# Patient Record
Sex: FEMALE | URBAN - METROPOLITAN AREA
[De-identification: names, ages, dates, MRNs, and addresses within clinical notes are randomized per-mention and may not be internally consistent; named-entity substitution may affect disease eponyms.]

---

## 2022-12-21 ENCOUNTER — APPOINTMENT (RX ONLY)
Dept: URBAN - METROPOLITAN AREA CLINIC 57 | Facility: CLINIC | Age: 27
Setting detail: DERMATOLOGY
End: 2022-12-21

## 2022-12-21 DIAGNOSIS — L70.0 ACNE VULGARIS: ICD-10-CM | Status: INADEQUATELY CONTROLLED

## 2022-12-21 DIAGNOSIS — L90.5 SCAR CONDITIONS AND FIBROSIS OF SKIN: ICD-10-CM | Status: INADEQUATELY CONTROLLED

## 2022-12-21 PROCEDURE — ? COUNSELING

## 2022-12-21 PROCEDURE — ? REFERRAL CORRESPONDENCE

## 2022-12-21 PROCEDURE — ? PRESCRIPTION

## 2022-12-21 PROCEDURE — 99204 OFFICE O/P NEW MOD 45 MIN: CPT

## 2022-12-21 PROCEDURE — ? TREATMENT REGIMEN

## 2022-12-21 RX ORDER — TRETIONIN 0.5 MG/G
CREAM TOPICAL QHS
Qty: 45 | Refills: 2 | Status: ERX | COMMUNITY
Start: 2022-12-21

## 2022-12-21 RX ORDER — CLINDAMYCIN PHOSPHATE 10 MG/ML
SOLUTION TOPICAL QD
Qty: 60 | Refills: 1 | Status: ERX | COMMUNITY
Start: 2022-12-21

## 2022-12-21 RX ORDER — DOXYCYCLINE HYCLATE 100 MG/1
CAPSULE, GELATIN COATED ORAL BID
Qty: 60 | Refills: 0 | Status: ERX | COMMUNITY
Start: 2022-12-21

## 2022-12-21 RX ADMIN — DOXYCYCLINE HYCLATE: 100 CAPSULE, GELATIN COATED ORAL at 00:00

## 2022-12-21 RX ADMIN — CLINDAMYCIN PHOSPHATE: 10 SOLUTION TOPICAL at 00:00

## 2022-12-21 RX ADMIN — TRETIONIN: 0.5 CREAM TOPICAL at 00:00

## 2022-12-21 ASSESSMENT — LOCATION DETAILED DESCRIPTION DERM
LOCATION DETAILED: LEFT MEDIAL FOREHEAD
LOCATION DETAILED: LEFT CHIN
LOCATION DETAILED: RIGHT CENTRAL BUCCAL CHEEK
LOCATION DETAILED: RIGHT FOREHEAD
LOCATION DETAILED: LEFT FOREHEAD
LOCATION DETAILED: RIGHT INFERIOR CENTRAL MALAR CHEEK
LOCATION DETAILED: LEFT SUPERIOR CENTRAL BUCCAL CHEEK
LOCATION DETAILED: LEFT INFERIOR LATERAL BUCCAL CHEEK
LOCATION DETAILED: LEFT INFERIOR CENTRAL MALAR CHEEK

## 2022-12-21 ASSESSMENT — LOCATION SIMPLE DESCRIPTION DERM
LOCATION SIMPLE: RIGHT FOREHEAD
LOCATION SIMPLE: LEFT CHEEK
LOCATION SIMPLE: RIGHT CHEEK
LOCATION SIMPLE: LEFT FOREHEAD
LOCATION SIMPLE: CHIN

## 2022-12-21 ASSESSMENT — LOCATION ZONE DERM: LOCATION ZONE: FACE

## 2022-12-21 NOTE — PROCEDURE: COUNSELING
High Dose Vitamin A Counseling: Side effects reviewed, pt to contact office should one occur.
Use Enhanced Medication Counseling?: Yes
Tazorac Counseling:  Patient advised that medication is irritating and drying. Patient may need to apply sparingly and wash off after an hour before eventually leaving it on overnight. The patient verbalized understanding of the proper use and possible adverse effects of tazorac. All of the patient's questions and concerns were addressed.
Birth Control Pills Pregnancy And Lactation Text: This medication should be avoided if pregnant and for the first 30 days post-partum.
Doxycycline Pregnancy And Lactation Text: This medication is Pregnancy Category D and not consider safe during pregnancy. It is also excreted in breast milk but is considered safe for shorter treatment courses.
Tetracycline Pregnancy And Lactation Text: This medication is Pregnancy Category D and not consider safe during pregnancy. It is also excreted in breast milk.
Erythromycin Counseling:  I discussed with the patient the risks of erythromycin including but not limited to GI upset, allergic reaction, drug rash, diarrhea, increase in liver enzymes, and yeast infections.
Topical Sulfur Applications Pregnancy And Lactation Text: This medication is Pregnancy Category C and has an unknown safety profile during pregnancy. It is unknown if this topical medication is excreted in breast milk.
Benzoyl Peroxide Counseling: Patient counseled that medicine may cause skin irritation and bleach clothing. In the event of skin irritation, the patient was advised to reduce the amount of the drug applied or use it less frequently. The patient verbalized understanding of the proper use and possible adverse effects of benzoyl peroxide. All of the patient's questions and concerns were addressed.
Winlevi Counseling:  I discussed with the patient the risks of topical clascoterone including but not limited to erythema, scaling, itching, and stinging. Patient voiced their understanding.
Dapsone Counseling: I discussed with the patient the risks of dapsone including but not limited to hemolytic anemia, agranulocytosis, rashes, methemoglobinemia, kidney failure, peripheral neuropathy, headaches, GI upset, and liver toxicity. Patients who start dapsone require monitoring including baseline LFTs and weekly CBCs for the first month, then every month thereafter. The patient verbalized understanding of the proper use and possible adverse effects of dapsone. All of the patient's questions and concerns were addressed.
Aklief counseling:  Patient advised to apply a pea-sized amount only at bedtime and wait 30 minutes after washing their face before applying. If too drying, patient may add a non-comedogenic moisturizer. The most commonly reported side effects including irritation, redness, scaling, dryness, stinging, burning, itching, and increased risk of sunburn. The patient verbalized understanding of the proper use and possible adverse effects of retinoids. All of the patient's questions and concerns were addressed.
Benzoyl Peroxide Pregnancy And Lactation Text: This medication is Pregnancy Category C. It is unknown if benzoyl peroxide is excreted in breast milk.
High Dose Vitamin A Pregnancy And Lactation Text: High dose vitamin A therapy is contraindicated during pregnancy and breast feeding.
Tazorac Pregnancy And Lactation Text: This medication is not safe during pregnancy. It is unknown if this medication is excreted in breast milk.
Azithromycin Pregnancy And Lactation Text: This medication is considered safe during pregnancy and is also secreted in breast milk.
Erythromycin Pregnancy And Lactation Text: This medication is Pregnancy Category B and is considered safe during pregnancy. It is also excreted in breast milk.
Bactrim Counseling:  I discussed with the patient the risks of sulfa antibiotics including but not limited to GI upset, allergic reaction, drug rash, diarrhea, dizziness, photosensitivity, and yeast infections. Rarely, more serious reactions can occur including but not limited to aplastic anemia, agranulocytosis, methemoglobinemia, blood dyscrasias, liver or kidney failure, lung infiltrates or desquamative/blistering drug rashes.
Minocycline Counseling: Patient advised regarding possible photosensitivity and discoloration of the teeth, skin, lips, tongue and gums. Patient instructed to avoid sunlight, if possible. When exposed to sunlight, patients should wear protective clothing, sunglasses, and sunscreen. The patient was instructed to call the office immediately if the following severe adverse effects occur:  hearing changes, easy bruising/bleeding, severe headache, or vision changes. The patient verbalized understanding of the proper use and possible adverse effects of minocycline. All of the patient's questions and concerns were addressed.
Spironolactone Counseling: Patient advised regarding risks of diarrhea, abdominal pain, hyperkalemia, birth defects (for female patients), liver toxicity and renal toxicity. The patient may need blood work to monitor liver and kidney function and potassium levels while on therapy. The patient verbalized understanding of the proper use and possible adverse effects of spironolactone. All of the patient's questions and concerns were addressed.
Include Pregnancy/Lactation Warning?: No
Topical Retinoid counseling:  Patient advised to apply a pea-sized amount only at bedtime and wait 30 minutes after washing their face before applying. If too drying, patient may add a non-comedogenic moisturizer. The patient verbalized understanding of the proper use and possible adverse effects of retinoids. All of the patient's questions and concerns were addressed.
Spironolactone Pregnancy And Lactation Text: This medication can cause feminization of the male fetus and should be avoided during pregnancy. The active metabolite is also found in breast milk.
Dapsone Pregnancy And Lactation Text: This medication is Pregnancy Category C and is not considered safe during pregnancy or breast feeding.
Topical Clindamycin Counseling: Patient counseled that this medication may cause skin irritation or allergic reactions. In the event of skin irritation, the patient was advised to reduce the amount of the drug applied or use it less frequently. The patient verbalized understanding of the proper use and possible adverse effects of clindamycin. All of the patient's questions and concerns were addressed.
Aklief Pregnancy And Lactation Text: It is unknown if this medication is safe to use during pregnancy. It is unknown if this medication is excreted in breast milk. Breastfeeding women should use the topical cream on the smallest area of the skin for the shortest time needed while breastfeeding. Do not apply to nipple and areola.
Winlevi Pregnancy And Lactation Text: This medication is considered safe during pregnancy and breastfeeding.
Bactrim Pregnancy And Lactation Text: This medication is Pregnancy Category D and is known to cause fetal risk. It is also excreted in breast milk.
Topical Clindamycin Pregnancy And Lactation Text: This medication is Pregnancy Category B and is considered safe during pregnancy. It is unknown if it is excreted in breast milk.
Doxycycline Counseling:  Patient counseled regarding possible photosensitivity and increased risk for sunburn. Patient instructed to avoid sunlight, if possible. When exposed to sunlight, patients should wear protective clothing, sunglasses, and sunscreen. The patient was instructed to call the office immediately if the following severe adverse effects occur:  hearing changes, easy bruising/bleeding, severe headache, or vision changes. The patient verbalized understanding of the proper use and possible adverse effects of doxycycline. All of the patient's questions and concerns were addressed.
Detail Level: Zone
Isotretinoin Counseling: Patient should get monthly blood tests, not donate blood, not drive at night if vision affected, not share medication, and not undergo elective surgery for 6 months after tx completed. Side effects reviewed, pt to contact office should one occur.
Tetracycline Counseling: Patient counseled regarding possible photosensitivity and increased risk for sunburn. Patient instructed to avoid sunlight, if possible. When exposed to sunlight, patients should wear protective clothing, sunglasses, and sunscreen. The patient was instructed to call the office immediately if the following severe adverse effects occur:  hearing changes, easy bruising/bleeding, severe headache, or vision changes. The patient verbalized understanding of the proper use and possible adverse effects of tetracycline. All of the patient's questions and concerns were addressed. Patient understands to avoid pregnancy while on therapy due to potential birth defects.
Isotretinoin Pregnancy And Lactation Text: This medication is Pregnancy Category X and is considered extremely dangerous during pregnancy. It is unknown if it is excreted in breast milk.
Azelaic Acid Counseling: Patient counseled that medicine may cause skin irritation and to avoid applying near the eyes. In the event of skin irritation, the patient was advised to reduce the amount of the drug applied or use it less frequently. The patient verbalized understanding of the proper use and possible adverse effects of azelaic acid. All of the patient's questions and concerns were addressed.
Topical Retinoid Pregnancy And Lactation Text: This medication is Pregnancy Category C. It is unknown if this medication is excreted in breast milk.
Azelaic Acid Pregnancy And Lactation Text: This medication is considered safe during pregnancy and breast feeding.
Azithromycin Counseling:  I discussed with the patient the risks of azithromycin including but not limited to GI upset, allergic reaction, drug rash, diarrhea, and yeast infections.
Sarecycline Counseling: Patient advised regarding possible photosensitivity and discoloration of the teeth, skin, lips, tongue and gums. Patient instructed to avoid sunlight, if possible. When exposed to sunlight, patients should wear protective clothing, sunglasses, and sunscreen. The patient was instructed to call the office immediately if the following severe adverse effects occur:  hearing changes, easy bruising/bleeding, severe headache, or vision changes. The patient verbalized understanding of the proper use and possible adverse effects of sarecycline. All of the patient's questions and concerns were addressed.
Birth Control Pills Counseling: Birth Control Pill Counseling: I discussed with the patient the potential side effects of OCPs including but not limited to increased risk of stroke, heart attack, thrombophlebitis, deep venous thrombosis, hepatic adenomas, breast changes, GI upset, headaches, and depression. The patient verbalized understanding of the proper use and possible adverse effects of OCPs. All of the patient's questions and concerns were addressed.
Topical Sulfur Applications Counseling: Topical Sulfur Counseling: Patient counseled that this medication may cause skin irritation or allergic reactions. In the event of skin irritation, the patient was advised to reduce the amount of the drug applied or use it less frequently. The patient verbalized understanding of the proper use and possible adverse effects of topical sulfur application. All of the patient's questions and concerns were addressed.
Detail Level: Detailed

## 2022-12-21 NOTE — PROCEDURE: TREATMENT REGIMEN
Otc Regimen: panoxyl wash bid, cerave lotion qhs
Initiate Treatment: Doxycycline 100mg bid , clindamycin 1% solution qd
Detail Level: Zone
Discontinue Regimen: [place] posay facial cleanser
Initiate Treatment: Tretinoin 0.05% cream qhs

## 2022-12-21 NOTE — PROCEDURE: MIPS QUALITY
Quality 130: Documentation Of Current Medications In The Medical Record: Current Medications Documented
Detail Level: Generalized
Quality 431: Preventive Care And Screening: Unhealthy Alcohol Use - Screening: Patient not screened for unhealthy alcohol use using a systematic screening method
Quality 402: Tobacco Use And Help With Quitting Among Adolescents: Patient screened for tobacco and never smoked

## 2022-12-21 NOTE — HPI: PIMPLES (ACNE)
Your Child's Health  11-14 Year Old Visit      Will Dellar  August 9, 2022      YOUR CHILD'S 11 to 14 YEAR-OLD VISIT    Personal Safety  Violence in the community and schools can impact a child's physical and emotional health. Talk to your child about bullying and emphasize that it should always be reported. Work with schools and administrators to address bullying and intimidation in your child's school. Your child should know they can always call you when they are uncomfortable or concerned that they are getting into a dangerous situation. In dating situations, teens need to know it is always okay to say \"no\" to something they are uncomfortable with and that \"no\" means NO and must be respected. Sadly, youth in this age range are most likely to be targeted for exploitation (human trafficking, prostitution). Tell your child that they should never form a relationship with someone who forbids them to tell their parents about it - these are always dangerous situations.    Smoking and Other Drugs  It's always important to tell your child that you do not want them to smoke or use drugs. When there are family members, friends, or peers who are smoking, discuss with your child how strong the addiction is and how difficult it is to quit. Studies show that youth who are exposed to e-cigarettes are more likely to try them themselves, and using e-cigarettes is a risk factor for smoking regular cigarettes.    Self-Esteem & Emotional Health  A strong sense of self-esteem contributes to good mental health and can help your child succeed in many aspects of life. As your child is becoming more independent, it is still important to spend time together as a family, start discussions about lots of topics (not just about things you disagree on), listen respectfully to your child and answer questions honestly. Make sure you are clear about family rules and expectations regarding their dress, activities, media use, etc.    Success in 
What Type Of Note Output Would You Prefer (Optional)?: Bullet Format
How Severe Is Your Acne?: moderate
school also builds self-esteem. Children who do well in school are less likely to be involved in risky behaviors. As school becomes more challenging academically, increasing demands will cause some stress that your child needs to learn to handle in a healthy way. Also, subtle learning or attention problems which may have been overlooked previously may become evident at higher grade levels. Poor school performance could also be a sign of anxiety or depression. Frequent absenteeism is a risk factor for dropping out of school; talk to your doctors or teachers if your child is missing a lot of school.    Involvement with activities that really interest your child is another way to build their self-esteem. Even if they are having some struggles with schoolwork, try to find an opportunity for them to pursue something that really interests them, like music, art, drama or sports.    Preteens and young teens can often be pruett and withdrawn, and this is often normal as long as it does not last long or significantly impact their schoolwork, activities, or relationships. Depression can impact adolescents with typical symptoms being irritability, persistent boredom, poor school performance and withdrawal from activities and relationships. Young teens identifying as lesbian, anthony, bisexual, transgender, or questioning are particularly at risk for emotional health problems; family acceptance of these young people is one of the strongest factors leading to positive outcomes for them.    Sexuality  While most youth in this age range have not had sexual experiences, your child needs to know that you do not want them to engage in sexual activity and that they can come to you with any questions about sex. Not having sex is the safest way to prevent pregnancy and avoid sexually transmitted infections. Experimenting with drugs or alcohol will increase your child's risk of making poor decisions, which is another reason to continue to 
Is This A New Presentation, Or A Follow-Up?: Acne
remind them that you do not want them to use drugs or alcohol. Studies show that parents have more influence on teen sexual values and decision-making than peers, media, siblings, teachers, and Denominational teachings. But you can't ignore the topic --talk about it! Use what is going on at school, what they are seeing on TV and what is happening with friends to talk about relationships and sex. Discuss how they can respond to pressures to use drugs or engage in sexual activity. If you aren't sure how to talk to your child about sex, find resources at the American Academy of Pediatrics healthychildren.org website.     Dental Health  It is important to care for permanent teeth by brushing at least twice a day with a fluoridated toothpaste,  flossing daily and seeing a dentist regularly. Limiting carbonated sodas and other sweet beverages is also important to prevent tooth decay. Gum chewers should choose sugarless gum. Youth who are active in contact sports should wear a mouth guard. Let us know if your water supply comes from a well; fluoride supplements may be indicated.    Body Image and Healthy Lifestyle  Youth at this age can be very sensitive to how they look compared to others. The media, unfortunately, frequently presents unhealthy, unrealistic body images to youth. It's not news that a healthy body size is obtained by eating healthy foods and being physically active every day and limiting unhealthy habits like junk food and too much time spent just sitting. Encourage healthy habits in your child and remember to provide praise about all of their good aspects, not just how they look.    Make your child a partner in healthy lifestyle choices by having them help with meal planning, shopping, and food preparation at this age. Do your best to eat meals as a family as often as you can, despite busy schedules. (And remember to keep the TV off and phones away from the table - this is one of the best times for a good 
Additional Comments (Use Complete Sentences): Patient states is currently using la rosche posay facial cleanser, panoxyl cream and Paulaâs choice, she is also using CeraVe lotion
family face-to-face communication.) Keep healthy choices in the home for in between meals snacks and do NOT keep a lot of junk food and unhealthy snacks in your home - if they are there, your children will eat them! Encourage lots of water to drink and avoid keeping soda and other sweet beverages in your home on a regular basis. Calcium and vitamin D remain very important for optimal bone health at this age. Your child needs 3 to 4 servings of a good source of calcium daily (low-fat or skim milk, yogurt, cheese, calcium-fortified orange juice or other foods). 600 IU of vitamin D daily is recommended. Most people cannot meet their vitamin D needs with their usual diet, so a multivitamin with iron supplement is a good way to get it. (A pure vitamin D supplement with 400 or 600 IU is also okay.)    Finding time to be physically active every day is a challenge with increasing school work and other activities. Encourage at least 60 minutes of physical activity each day. It does not have to be all at the same time. Physical education classes can count for some of it, but other activities, such as biking, dancing, and simply playing with the kids in the neighborhood are usually needed to get to the goal of 60 minutes a day. When participating in sports, make sure appropriate safety equipment is used (helmet, mouth  guard, eye protection, wrist guards, elbow and knee pads, athletic supporter). Limiting how much screen time or media use your child has is often necessary to make sure they have time for the recommended physical activity and exercise.    Sleep   Adequate sleep is important to all aspects of your child's health. Using digital devices before bedtime can interfere with quality sleep. Keeping electronic devices out of your child's room at night is a great step towards ensuring adequate sleep and rest. If you don't already have set rules about electronic media use, check out the American Academy of Pediatrics 
healthychildren.org website (search for “media use plan”).    Safety On the Land and In the Water   Seatbelts do not fit properly until a child is taller than 4'9\" and weighs more than 80 pounds. Smaller kids in this age group won’t like riding in a booster seat but that is where they are safest. High-backed booster seats should be used if there are low seat backs or no head rests in your car; backless boosters can be used if your car has high seat backs and head rests. Children are safest in the back seat until they are 13 years old. Talk to your child about never getting into a car with someone who is under the influence of drugs or alcohol; let them know that they can always call you for help if this situation ever occurs.     Helmets and other protective gear should always be worn when biking, skating or skateboarding; they may be recommended for additional sports (kayaking, water polo, etc) for older children.  All-terrain vehicles (ATVs) are very dangerous and adolescents under 16 years of age should not be allowed to ride them. When on a boat or engaging in water sports, a [employer] approved lifejacket should always be worn.    Sun and Gun safety  Most teenagers love the sun, but the ultraviolet radiation of the sun causes skin damage (premature aging) and increases the risk of skin cancer. The same sun protection recommendations apply to all ages: do not spend time the sun without using sunscreen with SPF of 15 or higher, avoid prolonged exposure between 11 and 3 PM when the sun intensity is the highest, and wear sunglasses and other sun protective clothing. Use of tanning beds should not be permitted for adolescents.    If you have an adolescent with a history of depression, suicide attempts, or aggressive behaviors, it is very dangerous to keep a firearm in your home. If firearms are stored in your home, be sure they are stored unloaded and locked with ammunition locked separately and be sure that 
children do not have access to the keys.    MEDICATION FOR FEVER OR PAIN:   Acetaminophen liquid (e.g., Tylenol or Tempra) may be given every four hours as needed for pain or fever. Acetaminophen liquid is less concentrated than the infant dropper bottle type. Be sure to check which product CONCENTRATION you are using.    CHILDREN’S Tylenol/Acetaminophen  (160 MG/5 mL)    Child’s Weight:  Dose:  36 - 47 pounds:    240 mg (7.5 mL (1 1/2 Teaspoons))  48 - 59 pounds:    320 mg (10.0 mL (2 Teaspoons))  60 - 71 pounds:    400 mg (12.5 mL (2 1/2 Teaspoons))  72 - 95 pounds:    480 mg (15.0 mL (3 Teaspoons))  Greater than 96 pounds:   640 mg (20.0 mL (4 Teaspoons))    CHILDREN’S Tylenol/Acetaminophen MELTAWAYS ( 80 MG tablets)    Child’s Weight:  Dose:  36 - 47 pounds:    240 mg (3 meltaway tablets)  48 - 59 pounds:    320 mg (4 meltaway tablets)  60 - 71 pounds:    400 mg (5 meltaway tablets)  72 - 95 pounds:    480 mg (6 meltaway tablets)  Greater than 96 pounds:   640 mg (8 meltaway tablets)    Juwan () Tylenol/Acetaminophen MELTAWAYS (160 MG tablets)    Child’s Weight:  Dose:  36 - 47 pounds:    240 mg (1 1/2 meltaway tablets)  48 - 59 pounds:    320 mg (2 meltaway tablets)  60 - 71 pounds:    400 mg (2 1/2 meltaway tablets)  72 - 95 pounds:    480 mg (3 meltaway tablets)  Greater than 96 pounds:   640 mg (4 meltaway tablets)    CHILDREN'S Ibuprofen (e.g., Advil or Motrin) may be given every six hours as needed for pain or fever.    48 - 59 pounds:    200 mg (2 Teaspoons)  60 - 71 pounds:    250 mg (2 1/2 Teaspoons)  72 - 95 pounds:    300 mg (3 Teaspoons)        NEXT VISIT:  IN 1 YEAR    Thank you for entrusting your care to [facility].    Also, check out “Children’s Health” on the [facility] Blog for updates on timely topics regarding children’s health!

## 2025-08-05 ENCOUNTER — APPOINTMENT (OUTPATIENT)
Dept: URBAN - METROPOLITAN AREA CLINIC 57 | Facility: CLINIC | Age: 30
Setting detail: DERMATOLOGY
End: 2025-08-05

## 2025-08-05 DIAGNOSIS — L70.0 ACNE VULGARIS: ICD-10-CM

## 2025-08-05 PROCEDURE — ? PRESCRIPTION

## 2025-08-05 PROCEDURE — ? COUNSELING

## 2025-08-05 PROCEDURE — ? REFERRAL CORRESPONDENCE

## 2025-08-05 PROCEDURE — ? TREATMENT REGIMEN

## 2025-08-05 RX ORDER — CLINDAMYCIN PHOSPHATE 10 MG/ML
SOLUTION TOPICAL BID
Qty: 60 | Refills: 2 | Status: ERX | COMMUNITY
Start: 2025-08-05

## 2025-08-05 RX ADMIN — CLINDAMYCIN PHOSPHATE: 10 SOLUTION TOPICAL at 00:00

## 2025-08-05 ASSESSMENT — LOCATION SIMPLE DESCRIPTION DERM
LOCATION SIMPLE: LEFT CHEEK
LOCATION SIMPLE: RIGHT CHEEK
LOCATION SIMPLE: SUPERIOR FOREHEAD
LOCATION SIMPLE: CHIN

## 2025-08-05 ASSESSMENT — LOCATION DETAILED DESCRIPTION DERM
LOCATION DETAILED: SUPERIOR MID FOREHEAD
LOCATION DETAILED: LEFT INFERIOR CENTRAL MALAR CHEEK
LOCATION DETAILED: RIGHT INFERIOR CENTRAL MALAR CHEEK
LOCATION DETAILED: LEFT CHIN

## 2025-08-05 ASSESSMENT — LOCATION ZONE DERM: LOCATION ZONE: FACE

## 2025-08-21 RX ORDER — CLINDAMYCIN PHOSPHATE 10 MG/ML
SOLUTION TOPICAL BID
Qty: 60 | Refills: 2 | Status: ERX